# Patient Record
Sex: MALE | Race: WHITE | Employment: STUDENT | URBAN - NONMETROPOLITAN AREA
[De-identification: names, ages, dates, MRNs, and addresses within clinical notes are randomized per-mention and may not be internally consistent; named-entity substitution may affect disease eponyms.]

---

## 2019-04-10 ENCOUNTER — OFFICE VISIT (OUTPATIENT)
Dept: ORTHOPEDIC SURGERY | Age: 19
End: 2019-04-10
Payer: COMMERCIAL

## 2019-04-10 VITALS
DIASTOLIC BLOOD PRESSURE: 67 MMHG | HEIGHT: 70 IN | BODY MASS INDEX: 20.76 KG/M2 | WEIGHT: 145 LBS | SYSTOLIC BLOOD PRESSURE: 106 MMHG

## 2019-04-10 DIAGNOSIS — M79.605 LEFT LEG PAIN: Primary | ICD-10-CM

## 2019-04-10 PROCEDURE — 99204 OFFICE O/P NEW MOD 45 MIN: CPT | Performed by: ORTHOPAEDIC SURGERY

## 2019-04-10 PROCEDURE — L4361 PNEUMA/VAC WALK BOOT PRE OTS: HCPCS | Performed by: ORTHOPAEDIC SURGERY

## 2019-04-10 NOTE — PROGRESS NOTES
History of Present Illness:  Steven Hylton is a 22 y.o. male seen today for the 1st time was playing ball in planted he felt a snap and pain laterally with severe pain where he couldn't play anymore    Chief complaint that brought the patient in the office today: Left lateral lower leg pain      Location left lateral mid shaft fibula pain  Severity 8 out of 10  Duration 1 day  Associated sign/symptoms he has significant difficulty walking cannot do calf raises severe lateral pain    I have reviewed and discussed the below Pain assessment findings with the patient. Pain Assessment  Location of Pain: Leg  Location Modifiers: Left, Lateral, Posterior  Severity of Pain: 8  Quality of Pain: Throbbing, Sharp, Aching  Duration of Pain: Persistent  Frequency of Pain: Constant  Date Pain First Started: 04/09/19  Aggravating Factors: Straightening, Walking, Standing, Stairs  Limiting Behavior: Yes  Relieving Factors: Rest  Result of Injury: Yes  Work-Related Injury: No  Are there other pain locations you wish to document?: No    Medical History  Patient's medications, allergies, past medical, surgical, social and family histories were reviewed and updated as appropriate. History reviewed. No pertinent past medical history. History reviewed. No pertinent family history.   Social History     Socioeconomic History    Marital status: None     Spouse name: None    Number of children: None    Years of education: None    Highest education level: None   Occupational History    None   Social Needs    Financial resource strain: None    Food insecurity:     Worry: None     Inability: None    Transportation needs:     Medical: None     Non-medical: None   Tobacco Use    Smoking status: Never Smoker    Smokeless tobacco: Never Used   Substance and Sexual Activity    Alcohol use: None    Drug use: None    Sexual activity: None   Lifestyle    Physical activity:     Days per week: None     Minutes per session: None    Stress: None   Relationships    Social connections:     Talks on phone: None     Gets together: None     Attends Moravian service: None     Active member of club or organization: None     Attends meetings of clubs or organizations: None     Relationship status: None    Intimate partner violence:     Fear of current or ex partner: None     Emotionally abused: None     Physically abused: None     Forced sexual activity: None   Other Topics Concern    None   Social History Narrative    None     No current outpatient medications on file. No current facility-administered medications for this visit. No Known Allergies    REVIEW OF SYSTEMS:   No rashes today  No new numbness  No tingling  No fevers  No depression  No new onset of pain        Pertinent items are noted in HPI  Review of systems reviewed from Patient History Form dated on 4/10/19 and available in the patient's chart under the Media tab. Examination:    General Exam:    Vitals: Blood pressure 106/67, height 5' 10\" (1.778 m), weight 145 lb (65.8 kg). Constitutional: Patient is adequately groomed with no evidence of malnutrition  Mental Status: The patient is oriented to time, place and person. The patient's mood and affect are appropriate. Lymphatic: The lymphatic examination bilaterally reveals all areas to be without enlargement or induration. Vascular: Examination reveals no swelling or calf tenderness. Peripheral pulses are palpable and 2+. Neurological: The patient has good coordination. There is no weakness or sensory deficit. Skin:    Head/Neck: inspection reveals no rashes, ulcerations or lesions. Trunk:  inspection reveals no rashes, ulcerations or lesions. Right Lower Extremity: inspection reveals no rashes, ulcerations or lesions. Left Lower Extremity: inspection reveals no rashes, ulcerations or lesions.       Lower Leg Examination  Inspection:  No obvious swelling or deformity    Palpation:  Severe pain along the mid shaft of the fibula    Rang of Motion:  Ankle motion is full knee motion is full    Strength:  Quadricep, hamstring, EHL, hip flexor, internal and external rotation of the hip against resistance, all 5 over 5 equal bilaterally    Special Tests:  Pedal pulses are +2 over 4 capillary refill is brisk sensation is intact negative Homans negative Julian negative Oneal's test no Achilles pain to palpation he does have pain with resisted external rotation of the ankle he has pain with compression or the squeeze test he has pain with external rotation    Skin: There are no rashes, ulcerations or lesions. Gait: Antalgic severe    Additional Comments:     Additional Examinations:  Right Lower Extremity: Examination of the right lower extremity does not show any tenderness, deformity or injury. Range of motion is unremarkable. There is no gross instability. There are no rashes, ulcerations or lesions. Strength and tone are normal.  Right Upper Extremity:  Examination of the right upper extremity does not show any tenderness, deformity or injury. Range of motion is unremarkable. There is no gross instability. There are no rashes, ulcerations or lesions. Strength and tone are normal.  Left Upper Extremity: Examination of the left upper extremity does not show any tenderness, deformity or injury. Range of motion is unremarkable. There is no gross instability. There are no rashes, ulcerations or lesions. Strength and tone are normal.  Lower Back: Examination of the lower back does not show any tenderness, deformity or injury. Range of motion is unremarkable. There is no gross instability. There are no rashes, ulcerations or lesions.   Strength and tone are normal.      Diagnostic Testing:    Views AP lateral  and I independently reviewed these films today in my office,   Body Part tibia and fibula Impression midshaft nondisplaced fibula fracture Assessment:  Left lower leg midshaft nondisplaced fibula fracture    Impression: Midshaft nondisplaced fibula fracture left fibula    Office Procedures:  No orders of the defined types were placed in this encounter. Treatment Plan:  High tide boot, follow-up in 2 weeks for another x-ray      Lee Maradiaga D.O. [address] [place] [address] 20 and Sports Medicine  Sports Fellowship Trained  Board Certified  [employer] Team Physician      Disclaimer: \"This note was dictated with voice recognition software. Though review and correction are routine, we apologize for any errors. \"

## 2019-04-11 ENCOUNTER — TELEPHONE (OUTPATIENT)
Dept: ORTHOPEDIC SURGERY | Age: 19
End: 2019-04-11

## 2019-04-11 NOTE — TELEPHONE ENCOUNTER
4/11/19  [facility] [place]    -  NO PRECERT REQUIRED - PER WALI -   REF #[id number] -  NDS

## 2019-04-24 ENCOUNTER — OFFICE VISIT (OUTPATIENT)
Dept: ORTHOPEDIC SURGERY | Age: 19
End: 2019-04-24
Payer: COMMERCIAL

## 2019-04-24 VITALS — WEIGHT: 145 LBS | BODY MASS INDEX: 20.76 KG/M2 | HEIGHT: 70 IN

## 2019-04-24 DIAGNOSIS — M79.605 LEFT LEG PAIN: Primary | ICD-10-CM

## 2019-04-24 PROCEDURE — G8420 CALC BMI NORM PARAMETERS: HCPCS | Performed by: ORTHOPAEDIC SURGERY

## 2019-04-24 PROCEDURE — 99214 OFFICE O/P EST MOD 30 MIN: CPT | Performed by: ORTHOPAEDIC SURGERY

## 2019-04-24 PROCEDURE — G8427 DOCREV CUR MEDS BY ELIG CLIN: HCPCS | Performed by: ORTHOPAEDIC SURGERY

## 2019-04-24 PROCEDURE — 1036F TOBACCO NON-USER: CPT | Performed by: ORTHOPAEDIC SURGERY

## 2019-04-24 NOTE — PROGRESS NOTES
depression  No new onset of pain        Pertinent items are noted in HPI  Review of systems reviewed from Patient History Form dated on 4/10/19 and available in the patient's chart under the Media tab. Examination:    General Exam:    Vitals: Height 5' 10\" (1.778 m), weight 145 lb (65.8 kg). Constitutional: Patient is adequately groomed with no evidence of malnutrition  Mental Status: The patient is oriented to time, place and person. The patient's mood and affect are appropriate. Lymphatic: The lymphatic examination bilaterally reveals all areas to be without enlargement or induration. Vascular: Examination reveals no swelling or calf tenderness. Peripheral pulses are palpable and 2+. Neurological: The patient has good coordination. There is no weakness or sensory deficit. Skin:    Head/Neck: inspection reveals no rashes, ulcerations or lesions. Trunk:  inspection reveals no rashes, ulcerations or lesions. Right Lower Extremity: inspection reveals no rashes, ulcerations or lesions. Left Lower Extremity: inspection reveals no rashes, ulcerations or lesions. Lumbar/Sacral Spine Examination  Inspection:  No obvious swelling or deformity    Palpation:  Mild pain to palpation along the distal fib/fracture site    Rang of Motion:  Good range of motion of his knee and ankle without pain    Strength:  Quadricep, hamstring, EHL, hip flexor, internal and external rotation of the hip against resistance, all 5 over 5 equal bilaterally    Special Tests:  Pedal pulses are +2 over 4 capillary refill is brisk sensation is intact negative Homans negative Julian negative Oneal's test negative anterior drawer negative talar tilt still has pain to palpation at the fracture site of this distal one third fibula fracture    Skin: There are no rashes, ulcerations or lesions.     Gait: high tide boot    Additional Comments:     Additional Examinations:  Right Lower Extremity: Examination of the right lower extremity does not show any tenderness, deformity or injury. Range of motion is unremarkable. There is no gross instability. There are no rashes, ulcerations or lesions. Strength and tone are normal.  Right Upper Extremity:  Examination of the right upper extremity does not show any tenderness, deformity or injury. Range of motion is unremarkable. There is no gross instability. There are no rashes, ulcerations or lesions. Strength and tone are normal.  Left Upper Extremity: Examination of the left upper extremity does not show any tenderness, deformity or injury. Range of motion is unremarkable. There is no gross instability. There are no rashes, ulcerations or lesions. Strength and tone are normal.  Lower Back: Examination of the lower back does not show any tenderness, deformity or injury. Range of motion is unremarkable. There is no gross instability. There are no rashes, ulcerations or lesions. Strength and tone are normal.      Diagnostic Testing:    Views AP lateral  and I independently reviewed these films today in my office,   Body Part fibula and tibia Impression distal one third to mid one third fibula fracture in good alignment          Assessment:  Unchanged fibula fracture    Impression: unchanged fibula fracture    Office Procedures:  Orders Placed This Encounter   Procedures    XR TIBIA FIBULA LEFT (2 VIEWS)     Standing Status:   Future     Number of Occurrences:   1     Standing Expiration Date:   4/24/2020       Treatment Plan:  High tide boot for 2 more weeks follow up for 1 more x-ray and we'll start him doing some therapy      Deatra Mohs. Hess, D.O. [facility] and Sports Medicine  Sports Fellowship Trained  Board Certified  Hiral [employer] Team Physician      Disclaimer: \"This note was dictated with voice recognition software. Though review and correction are routine, we apologize for any errors. \"

## 2019-05-08 ENCOUNTER — OFFICE VISIT (OUTPATIENT)
Dept: ORTHOPEDIC SURGERY | Age: 19
End: 2019-05-08
Payer: COMMERCIAL

## 2019-05-08 VITALS — WEIGHT: 145.06 LBS | BODY MASS INDEX: 20.77 KG/M2 | HEIGHT: 70 IN

## 2019-05-08 DIAGNOSIS — M79.605 LEFT LEG PAIN: Primary | ICD-10-CM

## 2019-05-08 PROCEDURE — 1036F TOBACCO NON-USER: CPT | Performed by: ORTHOPAEDIC SURGERY

## 2019-05-08 PROCEDURE — G8427 DOCREV CUR MEDS BY ELIG CLIN: HCPCS | Performed by: ORTHOPAEDIC SURGERY

## 2019-05-08 PROCEDURE — G8420 CALC BMI NORM PARAMETERS: HCPCS | Performed by: ORTHOPAEDIC SURGERY

## 2019-05-08 PROCEDURE — 99214 OFFICE O/P EST MOD 30 MIN: CPT | Performed by: ORTHOPAEDIC SURGERY

## 2019-05-08 NOTE — PROGRESS NOTES
History of Present Illness:  Philomena Johnson is a 22 y.o. male recheck evaluation left fibula fracture    Chief complaint that brought the patient in the office today: He is doing very well much less pain      Location left mid to distal one third fibula fracture  Severity significantly improved  Duration 4 weeks since we started him in a high tide boot  Associated sign/symptoms no real symptomatology at this time    Medical History  Patient's medications, allergies, past medical, surgical, social and family histories were reviewed and updated as appropriate. History reviewed. No pertinent past medical history. History reviewed. No pertinent family history. Social History     Socioeconomic History    Marital status: Single     Spouse name: None    Number of children: None    Years of education: None    Highest education level: None   Occupational History    None   Social Needs    Financial resource strain: None    Food insecurity:     Worry: None     Inability: None    Transportation needs:     Medical: None     Non-medical: None   Tobacco Use    Smoking status: Never Smoker    Smokeless tobacco: Never Used   Substance and Sexual Activity    Alcohol use: None    Drug use: None    Sexual activity: None   Lifestyle    Physical activity:     Days per week: None     Minutes per session: None    Stress: None   Relationships    Social connections:     Talks on phone: None     Gets together: None     Attends Jain service: None     Active member of club or organization: None     Attends meetings of clubs or organizations: None     Relationship status: None    Intimate partner violence:     Fear of current or ex partner: None     Emotionally abused: None     Physically abused: None     Forced sexual activity: None   Other Topics Concern    None   Social History Narrative    None     No current outpatient medications on file. No current facility-administered medications for this visit.       No Known Allergies    REVIEW OF SYSTEMS:   No rashes today  No new numbness  No tingling  No fevers  No depression  No new onset of pain        Pertinent items are noted in HPI  Review of systems reviewed from Patient History Form dated on 4/10/19 and available in the patient's chart under the Media tab. Examination:    General Exam:    Vitals: Height 5' 10\" (1.778 m), weight 145 lb 1 oz (65.8 kg). Constitutional: Patient is adequately groomed with no evidence of malnutrition  Mental Status: The patient is oriented to time, place and person. The patient's mood and affect are appropriate. Lymphatic: The lymphatic examination bilaterally reveals all areas to be without enlargement or induration. Vascular: Examination reveals no swelling or calf tenderness. Peripheral pulses are palpable and 2+. Neurological: The patient has good coordination. There is no weakness or sensory deficit. Skin:    Head/Neck: inspection reveals no rashes, ulcerations or lesions. Trunk:  inspection reveals no rashes, ulcerations or lesions. Right Lower Extremity: inspection reveals no rashes, ulcerations or lesions. Left Lower Extremity: inspection reveals no rashes, ulcerations or lesions. Leg Examination  Inspection:  Inspection shows no swelling no formal deformity    Palpation:  No significant pain to palpation at this time    Rang of Motion:  Full active and passive range of motion of his knee And ankle    Strength:  Quadricep, hamstring, EHL, hip flexor, internal and external rotation of the hip against resistance, all 5 over 5 equal bilaterally    Special Tests:  Pedal pulses are +2 over 4 capillary refill is brisk sensation is intact negative Homans negative Julian negative Oneal's test    Skin: There are no rashes, ulcerations or lesions.     Gait: Antalgic gait    Additional Comments:     Additional Examinations:  Right Lower Extremity: Examination of the right lower extremity does not show any tenderness, deformity or injury. Range of motion is unremarkable. There is no gross instability. There are no rashes, ulcerations or lesions. Strength and tone are normal.  Right Upper Extremity:  Examination of the right upper extremity does not show any tenderness, deformity or injury. Range of motion is unremarkable. There is no gross instability. There are no rashes, ulcerations or lesions. Strength and tone are normal.  Left Upper Extremity: Examination of the left upper extremity does not show any tenderness, deformity or injury. Range of motion is unremarkable. There is no gross instability. There are no rashes, ulcerations or lesions. Strength and tone are normal.  Lower Back: Examination of the lower back does not show any tenderness, deformity or injury. Range of motion is unremarkable. There is no gross instability. There are no rashes, ulcerations or lesions. Strength and tone are normal.      Diagnostic Testing:    Views AP lateral  and I independently reviewed these films today in my office,   Body Part tibia and fibula Impression callus formation around the fibula fracture is excellent          Assessment:  4 weeks healing fibula fracture    Impression: Same    Office Procedures:  Orders Placed This Encounter   Procedures    XR TIBIA FIBULA LEFT (2 VIEWS)     Standing Status:   Future     Number of Occurrences:   1     Standing Expiration Date:   5/8/2020       Treatment Plan:  I would like for him to wean out of the high tide boot and start jogging in 3 weeks      Gurjit Kidd. LINDA Maradiaga. [address] [place] [address] 20 and Sports Medicine  Sports Fellowship Trained  Board Certified  [employer] Team Physician      Disclaimer: \"This note was dictated with voice recognition software. Though review and correction are routine, we apologize for any errors. \"

## 2020-04-18 ENCOUNTER — APPOINTMENT (OUTPATIENT)
Dept: CT IMAGING | Age: 20
DRG: 155 | End: 2020-04-18
Payer: COMMERCIAL

## 2020-04-18 ENCOUNTER — HOSPITAL ENCOUNTER (INPATIENT)
Age: 20
LOS: 1 days | Discharge: HOME | DRG: 155 | End: 2020-04-19
Attending: EMERGENCY MEDICINE | Admitting: SURGERY
Payer: COMMERCIAL
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
## 2020-04-18 LAB
ALBUMIN SERPL-MCNC: 4.4 G/DL (ref 3.5–5.1)
ALP BLD-CCNC: 62 U/L (ref 38–126)
ALT SERPL-CCNC: 15 U/L (ref 11–66)
ANION GAP SERPL CALCULATED.3IONS-SCNC: 14 MEQ/L (ref 8–16)
APTT: 25.9 SECONDS (ref 22–38)
AST SERPL-CCNC: 25 U/L (ref 5–40)
BILIRUB SERPL-MCNC: 0.5 MG/DL (ref 0.3–1.2)
BUN BLDV-MCNC: 13 MG/DL (ref 7–22)
CALCIUM SERPL-MCNC: 9.3 MG/DL (ref 8.5–10.5)
CHLORIDE BLD-SCNC: 100 MEQ/L (ref 98–111)
CO2: 26 MEQ/L (ref 23–33)
CREAT SERPL-MCNC: 1 MG/DL (ref 0.4–1.2)
ERYTHROCYTE [DISTWIDTH] IN BLOOD BY AUTOMATED COUNT: 11.8 % (ref 11.5–14.5)
ERYTHROCYTE [DISTWIDTH] IN BLOOD BY AUTOMATED COUNT: 39.6 FL (ref 35–45)
GFR SERPL CREATININE-BSD FRML MDRD: > 90 ML/MIN/1.73M2
GLUCOSE BLD-MCNC: 142 MG/DL (ref 70–108)
HCT VFR BLD CALC: 46.6 % (ref 42–52)
HEMOGLOBIN: 15.8 GM/DL (ref 14–18)
INR BLD: 1.12 (ref 0.85–1.13)
LACTIC ACID, SEPSIS: 1.9 MMOL/L (ref 0.5–1.9)
LACTIC ACID, SEPSIS: 2.5 MMOL/L (ref 0.5–1.9)
MCH RBC QN AUTO: 31.2 PG (ref 26–33)
MCHC RBC AUTO-ENTMCNC: 33.9 GM/DL (ref 32.2–35.5)
MCV RBC AUTO: 91.9 FL (ref 80–94)
OSMOLALITY CALCULATION: 281.9 MOSMOL/KG (ref 275–300)
PLATELET # BLD: 314 THOU/MM3 (ref 130–400)
PMV BLD AUTO: 8.4 FL (ref 9.4–12.4)
POTASSIUM REFLEX MAGNESIUM: 3.9 MEQ/L (ref 3.5–5.2)
RBC # BLD: 5.07 MILL/MM3 (ref 4.7–6.1)
SODIUM BLD-SCNC: 140 MEQ/L (ref 135–145)
TOTAL PROTEIN: 7.3 G/DL (ref 6.1–8)
WBC # BLD: 12.8 THOU/MM3 (ref 4.8–10.8)

## 2020-04-18 PROCEDURE — 1200000000 HC SEMI PRIVATE

## 2020-04-18 PROCEDURE — 80053 COMPREHEN METABOLIC PANEL: CPT

## 2020-04-18 PROCEDURE — 96374 THER/PROPH/DIAG INJ IV PUSH: CPT

## 2020-04-18 PROCEDURE — 85730 THROMBOPLASTIN TIME PARTIAL: CPT

## 2020-04-18 PROCEDURE — 36415 COLL VENOUS BLD VENIPUNCTURE: CPT

## 2020-04-18 PROCEDURE — 85027 COMPLETE CBC AUTOMATED: CPT

## 2020-04-18 PROCEDURE — G0378 HOSPITAL OBSERVATION PER HR: HCPCS

## 2020-04-18 PROCEDURE — APPSS60 APP SPLIT SHARED TIME 46-60 MINUTES: Performed by: PHYSICIAN ASSISTANT

## 2020-04-18 PROCEDURE — 85610 PROTHROMBIN TIME: CPT

## 2020-04-18 PROCEDURE — 72125 CT NECK SPINE W/O DYE: CPT

## 2020-04-18 PROCEDURE — 76376 3D RENDER W/INTRP POSTPROCES: CPT

## 2020-04-18 PROCEDURE — 6820000002 HC L2 INJURY CALL ACTIVATION

## 2020-04-18 PROCEDURE — 74177 CT ABD & PELVIS W/CONTRAST: CPT

## 2020-04-18 PROCEDURE — 71260 CT THORAX DX C+: CPT

## 2020-04-18 PROCEDURE — 99285 EMERGENCY DEPT VISIT HI MDM: CPT

## 2020-04-18 PROCEDURE — 0HQ1XZZ REPAIR FACE SKIN, EXTERNAL APPROACH: ICD-10-PCS | Performed by: EMERGENCY MEDICINE

## 2020-04-18 PROCEDURE — 0CQ1XZZ REPAIR LOWER LIP, EXTERNAL APPROACH: ICD-10-PCS | Performed by: EMERGENCY MEDICINE

## 2020-04-18 PROCEDURE — 99223 1ST HOSP IP/OBS HIGH 75: CPT | Performed by: SURGERY

## 2020-04-18 PROCEDURE — 6360000004 HC RX CONTRAST MEDICATION: Performed by: EMERGENCY MEDICINE

## 2020-04-18 PROCEDURE — 83605 ASSAY OF LACTIC ACID: CPT

## 2020-04-18 PROCEDURE — 70450 CT HEAD/BRAIN W/O DYE: CPT

## 2020-04-18 PROCEDURE — 6360000002 HC RX W HCPCS: Performed by: PHYSICIAN ASSISTANT

## 2020-04-18 PROCEDURE — 70486 CT MAXILLOFACIAL W/O DYE: CPT

## 2020-04-18 PROCEDURE — 2709999900 HC NON-CHARGEABLE SUPPLY

## 2020-04-18 RX ORDER — PROMETHAZINE HYDROCHLORIDE 25 MG/1
12.5 TABLET ORAL EVERY 6 HOURS PRN
Status: DISCONTINUED | OUTPATIENT
Start: 2020-04-18 | End: 2020-04-19 | Stop reason: HOSPADM

## 2020-04-18 RX ORDER — CLINDAMYCIN PHOSPHATE AND BENZOYL PEROXIDE 10; 37.5 MG/G; MG/G
1 GEL TOPICAL DAILY PRN
Status: ON HOLD | COMMUNITY
End: 2020-04-19 | Stop reason: HOSPADM

## 2020-04-18 RX ORDER — SODIUM CHLORIDE 0.9 % (FLUSH) 0.9 %
10 SYRINGE (ML) INJECTION EVERY 12 HOURS SCHEDULED
Status: DISCONTINUED | OUTPATIENT
Start: 2020-04-18 | End: 2020-04-19 | Stop reason: HOSPADM

## 2020-04-18 RX ORDER — HYDROCODONE BITARTRATE AND ACETAMINOPHEN 5; 325 MG/1; MG/1
1 TABLET ORAL EVERY 4 HOURS PRN
Status: DISCONTINUED | OUTPATIENT
Start: 2020-04-18 | End: 2020-04-19 | Stop reason: HOSPADM

## 2020-04-18 RX ORDER — ACETAMINOPHEN 325 MG/1
650 TABLET ORAL EVERY 4 HOURS PRN
Status: DISCONTINUED | OUTPATIENT
Start: 2020-04-18 | End: 2020-04-19 | Stop reason: HOSPADM

## 2020-04-18 RX ORDER — SODIUM CHLORIDE 0.9 % (FLUSH) 0.9 %
10 SYRINGE (ML) INJECTION PRN
Status: DISCONTINUED | OUTPATIENT
Start: 2020-04-18 | End: 2020-04-19 | Stop reason: HOSPADM

## 2020-04-18 RX ORDER — ONDANSETRON 2 MG/ML
4 INJECTION INTRAMUSCULAR; INTRAVENOUS EVERY 6 HOURS PRN
Status: DISCONTINUED | OUTPATIENT
Start: 2020-04-18 | End: 2020-04-19 | Stop reason: HOSPADM

## 2020-04-18 RX ORDER — POLYETHYLENE GLYCOL 3350 17 G/17G
17 POWDER, FOR SOLUTION ORAL DAILY PRN
Status: DISCONTINUED | OUTPATIENT
Start: 2020-04-18 | End: 2020-04-19 | Stop reason: HOSPADM

## 2020-04-18 RX ORDER — MORPHINE SULFATE 4 MG/ML
4 INJECTION, SOLUTION INTRAMUSCULAR; INTRAVENOUS
Status: DISCONTINUED | OUTPATIENT
Start: 2020-04-18 | End: 2020-04-19 | Stop reason: HOSPADM

## 2020-04-18 RX ORDER — HYDROCODONE BITARTRATE AND ACETAMINOPHEN 5; 325 MG/1; MG/1
2 TABLET ORAL EVERY 4 HOURS PRN
Status: DISCONTINUED | OUTPATIENT
Start: 2020-04-18 | End: 2020-04-19 | Stop reason: HOSPADM

## 2020-04-18 RX ORDER — ONDANSETRON 2 MG/ML
4 INJECTION INTRAMUSCULAR; INTRAVENOUS EVERY 6 HOURS PRN
Status: DISCONTINUED | OUTPATIENT
Start: 2020-04-18 | End: 2020-04-18 | Stop reason: SDUPTHER

## 2020-04-18 RX ORDER — MORPHINE SULFATE 2 MG/ML
2 INJECTION, SOLUTION INTRAMUSCULAR; INTRAVENOUS
Status: DISCONTINUED | OUTPATIENT
Start: 2020-04-18 | End: 2020-04-19 | Stop reason: HOSPADM

## 2020-04-18 RX ADMIN — IOPAMIDOL 80 ML: 755 INJECTION, SOLUTION INTRAVENOUS at 21:07

## 2020-04-18 RX ADMIN — ONDANSETRON 4 MG: 2 INJECTION INTRAMUSCULAR; INTRAVENOUS at 22:18

## 2020-04-18 ASSESSMENT — ENCOUNTER SYMPTOMS
APNEA: 0
DIARRHEA: 0
EYES NEGATIVE: 1
BACK PAIN: 0
ROS SKIN COMMENTS: MULTIPLE ABRASIONS
NAUSEA: 0
SORE THROAT: 0
CHEST TIGHTNESS: 0
COUGH: 0
BLOOD IN STOOL: 0
CHOKING: 0
ABDOMINAL PAIN: 0
VOMITING: 0
ABDOMINAL DISTENTION: 0
SHORTNESS OF BREATH: 0
WHEEZING: 0

## 2020-04-18 ASSESSMENT — PAIN SCALES - GENERAL
PAINLEVEL_OUTOF10: 6
PAINLEVEL_OUTOF10: 6

## 2020-04-18 ASSESSMENT — PAIN DESCRIPTION - PAIN TYPE
TYPE: ACUTE PAIN
TYPE: ACUTE PAIN

## 2020-04-18 ASSESSMENT — PAIN DESCRIPTION - LOCATION
LOCATION: NECK
LOCATION: FACE;HEAD;NECK

## 2020-04-19 VITALS
TEMPERATURE: 98.2 F | OXYGEN SATURATION: 97 % | BODY MASS INDEX: 21.37 KG/M2 | HEART RATE: 80 BPM | HEIGHT: 70 IN | RESPIRATION RATE: 16 BRPM | SYSTOLIC BLOOD PRESSURE: 114 MMHG | WEIGHT: 149.3 LBS | DIASTOLIC BLOOD PRESSURE: 71 MMHG

## 2020-04-19 LAB
AMPHETAMINE+METHAMPHETAMINE URINE SCREEN: NEGATIVE
BARBITURATE QUANTITATIVE URINE: NEGATIVE
BENZODIAZEPINE QUANTITATIVE URINE: NEGATIVE
BILIRUBIN URINE: NEGATIVE
BLOOD, URINE: NEGATIVE
CANNABINOID QUANTITATIVE URINE: NEGATIVE
CHARACTER, URINE: CLEAR
COCAINE METABOLITE QUANTITATIVE URINE: NEGATIVE
COLOR: YELLOW
GLUCOSE, URINE: NEGATIVE MG/DL
KETONES, URINE: NEGATIVE
LACTIC ACID, SEPSIS: 1 MMOL/L (ref 0.5–1.9)
LEUKOCYTE EST, POC: NEGATIVE
NITRITE, URINE: NEGATIVE
OPIATES, URINE: NEGATIVE
OXYCODONE: NEGATIVE
PH UA: 6.5 (ref 5–9)
PHENCYCLIDINE QUANTITATIVE URINE: NEGATIVE
PROTEIN UA: NEGATIVE MG/DL
SPECIFIC GRAVITY UA: 1.01 (ref 1–1.03)
UROBILINOGEN, URINE: 0.2 EU/DL (ref 0–1)

## 2020-04-19 PROCEDURE — 83605 ASSAY OF LACTIC ACID: CPT

## 2020-04-19 PROCEDURE — 6370000000 HC RX 637 (ALT 250 FOR IP): Performed by: PHYSICIAN ASSISTANT

## 2020-04-19 PROCEDURE — APPNB180 APP NON BILLABLE TIME > 60 MINS: Performed by: PHYSICIAN ASSISTANT

## 2020-04-19 PROCEDURE — 36415 COLL VENOUS BLD VENIPUNCTURE: CPT

## 2020-04-19 PROCEDURE — 99232 SBSQ HOSP IP/OBS MODERATE 35: CPT | Performed by: SURGERY

## 2020-04-19 PROCEDURE — 94760 N-INVAS EAR/PLS OXIMETRY 1: CPT

## 2020-04-19 PROCEDURE — G0378 HOSPITAL OBSERVATION PER HR: HCPCS

## 2020-04-19 PROCEDURE — APPSS60 APP SPLIT SHARED TIME 46-60 MINUTES: Performed by: PHYSICIAN ASSISTANT

## 2020-04-19 PROCEDURE — 99255 IP/OBS CONSLTJ NEW/EST HI 80: CPT | Performed by: SURGERY

## 2020-04-19 PROCEDURE — 81003 URINALYSIS AUTO W/O SCOPE: CPT

## 2020-04-19 PROCEDURE — 2580000003 HC RX 258: Performed by: PHYSICIAN ASSISTANT

## 2020-04-19 PROCEDURE — 80307 DRUG TEST PRSMV CHEM ANLYZR: CPT

## 2020-04-19 PROCEDURE — 97161 PT EVAL LOW COMPLEX 20 MIN: CPT

## 2020-04-19 RX ORDER — HYDROCODONE BITARTRATE AND ACETAMINOPHEN 5; 325 MG/1; MG/1
1 TABLET ORAL EVERY 6 HOURS PRN
Qty: 12 TABLET | Refills: 0 | Status: SHIPPED | OUTPATIENT
Start: 2020-04-19 | End: 2020-04-22

## 2020-04-19 RX ADMIN — HYDROCODONE BITARTRATE AND ACETAMINOPHEN 1 TABLET: 5; 325 TABLET ORAL at 09:59

## 2020-04-19 RX ADMIN — Medication 10 ML: at 10:00

## 2020-04-19 RX ADMIN — HYDROCODONE BITARTRATE AND ACETAMINOPHEN 2 TABLET: 5; 325 TABLET ORAL at 15:31

## 2020-04-19 RX ADMIN — Medication 10 ML: at 00:59

## 2020-04-19 RX ADMIN — HYDROCODONE BITARTRATE AND ACETAMINOPHEN 1 TABLET: 5; 325 TABLET ORAL at 02:38

## 2020-04-19 ASSESSMENT — PAIN DESCRIPTION - ORIENTATION
ORIENTATION: MID
ORIENTATION: MID

## 2020-04-19 ASSESSMENT — PAIN DESCRIPTION - LOCATION
LOCATION: FACE;HEAD
LOCATION: FACE;HEAD

## 2020-04-19 ASSESSMENT — PAIN DESCRIPTION - DESCRIPTORS
DESCRIPTORS: ACHING
DESCRIPTORS: ACHING

## 2020-04-19 ASSESSMENT — PAIN DESCRIPTION - ONSET
ONSET: ON-GOING
ONSET: ON-GOING

## 2020-04-19 ASSESSMENT — PAIN DESCRIPTION - PAIN TYPE
TYPE: ACUTE PAIN
TYPE: ACUTE PAIN

## 2020-04-19 ASSESSMENT — PAIN SCALES - GENERAL
PAINLEVEL_OUTOF10: 5
PAINLEVEL_OUTOF10: 0
PAINLEVEL_OUTOF10: 3
PAINLEVEL_OUTOF10: 0
PAINLEVEL_OUTOF10: 0
PAINLEVEL_OUTOF10: 7

## 2020-04-19 ASSESSMENT — PAIN - FUNCTIONAL ASSESSMENT
PAIN_FUNCTIONAL_ASSESSMENT: ACTIVITIES ARE NOT PREVENTED
PAIN_FUNCTIONAL_ASSESSMENT: ACTIVITIES ARE NOT PREVENTED

## 2020-04-19 ASSESSMENT — PAIN DESCRIPTION - FREQUENCY
FREQUENCY: CONTINUOUS
FREQUENCY: CONTINUOUS

## 2020-04-19 ASSESSMENT — PAIN DESCRIPTION - PROGRESSION
CLINICAL_PROGRESSION: GRADUALLY WORSENING
CLINICAL_PROGRESSION: GRADUALLY WORSENING
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
## 2020-04-19 NOTE — PLAN OF CARE
Problem: Pain:  Goal: Pain level will decrease  Description: Pain level will decrease  Outcome: Ongoing  Note: Patient complains of facial/neck pain rated 5-6/10 during this shift. PRN pain medications administered as ordered. Will continue to monitor. Problem: Pain:  Goal: Control of acute pain  Description: Control of acute pain  Outcome: Ongoing  Note: Patient complains of facial/neck pain rated 5-6/10 during this shift. PRN pain medications administered as ordered. Will continue to monitor. Problem: Pain:  Goal: Control of chronic pain  Description: Control of chronic pain  Outcome: Ongoing  Note: Patient complains of facial/neck pain rated 5-6/10 during this shift. PRN pain medications administered as ordered. Will continue to monitor. Problem: Falls - Risk of:  Goal: Will remain free from falls  Description: Will remain free from falls  Outcome: Ongoing  Note: Patient remained free from falls during this shift. Patient is educated on and encouraged to use the call light for assistance from staff with needs. Bed wheels are locked and bed is in lowest position; bed alarm on. Possessions are within reach; Pathways are clear     Problem: Falls - Risk of:  Goal: Absence of physical injury  Description: Absence of physical injury  Outcome: Ongoing  Note: Patient remained free from falls during this shift. Patient is educated on and encouraged to use the call light for assistance from staff with needs. Bed wheels are locked and bed is in lowest position; bed alarm on. Possessions are within reach; Pathways are clear     Problem: Discharge Planning:  Goal: Discharged to appropriate level of care  Description: Discharged to appropriate level of care  Outcome: Ongoing  Note: Discharge planning in progress at this time. Patient expects to be discharged home with family. Care plan reviewed with patient. Patient verbalizes understanding of the plan of care and contributes to goal setting.

## 2020-04-19 NOTE — ED NOTES
ED to inpatient nurses report    Chief Complaint   [facility] presents [facility]   [facility]    Trauma      Present to ED from home  LOC: alert and orientated to name, place, date  Vital signs   Vitals:    04/18/20 2016 04/18/20 2030 04/18/20 2112 04/18/20 2142   BP: 131/75 126/73 127/78 117/79   Pulse: 86 72 81 75   Resp:       Temp:       TempSrc:       SpO2: 99% 100% 100% 99%   Weight:       Height:          Oxygen Baseline 99% ROOM AIR     Current needs required NONE Bipap/Cpap No  LDAs:   Peripheral IV 04/18/20 Left Antecubital (Active)   Site Assessment Dry; Intact; Clean 4/18/2020  9:56 PM   Line Status Normal saline locked; Flushed;Specimen collected 4/18/2020  7:54 PM   Dressing Status Clean;Dry; Intact 4/18/2020  7:54 PM   Dressing Intervention New 4/18/2020  7:54 PM       Peripheral IV 04/18/20 Right Antecubital (Active)   Site Assessment Dry; Intact; Clean 4/18/2020  9:56 PM   Dressing Status Clean;Dry; Intact 4/18/2020  9:35 PM     Mobility: Requires assistance * 1  Pending ED orders: NONE   Present condition: STABLE / HEADACHE     Electronically signed by Jordan Crenshaw RN on 4/18/2020 at 9:56 PM       Jordan Crenshaw RN  04/18/20 5610
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
## 2020-04-20 NOTE — DISCHARGE SUMMARY
needed for Pain for up to 3 days. Patient Instructions:    Discharge lab work: none  Activity: activity as tolerated  Diet: DIET GENERAL;    Code Status: Full Code    Follow-up visits:   Noel Engel, [address] [place] [address]  [phone]    Schedule an appointment as soon as possible for a visit  Please call office to schedule appointment for Wednesday    [address]  [phone]    Schedule an appointment as soon as possible for a visit in 5 days  For suture removal       Procedures: Sutures applied in ED    Consults:   Plastic surgery. Examination:  Vitals:  Vitals:    04/19/20 0945 04/19/20 1057 04/19/20 1123 04/19/20 1523   BP: (!) 140/70  124/65 114/71   Pulse: 102  81 80   Resp: 16  16 16   Temp: 99 °F (37.2 °C)  98.4 °F (36.9 °C) 98.2 °F (36.8 °C)   TempSrc: Oral  Oral Oral   SpO2: 96% 93% 96% 97%   Weight:       Height:         Weight: Weight: 149 lb 4.8 oz (67.7 kg)     24 hour intake/output:    Intake/Output Summary (Last 24 hours) at 4/19/2020 2015  Last data filed at 4/19/2020 0959  Gross per 24 hour   Intake 460 ml   Output --   Net 460 ml       GENERAL: Presents sitting upright in bed unassisted, alert and oriented; In no acute distress, well nourished, and appears stated age. SKIN: Pink, warm and dry. EYES: No apparent trauma, discharge, or hematoma bilaterally. Intact visual acuity - able to identify objects, PERRL at 3mm, EOM intact. MOUTH: Teeth present with fractured left central superior incisor, moderate swelling of lip margines, no active bleeding. Mucosa is pink, moist, absent of lesions, bleeding, or patches. CARDIO: No visible chest wall deformity. No heaves or lifts. Strong/regular S1/S2 rate and rhythm. No rubs murmurs, or gallops. 2+ radial, and dorsalis pedal pulses. Capillary refill <2 sec. No extremity edema noted.    PULMONARY:  Trachea midline, no audible wheezing, increased

## 2020-04-20 NOTE — PROGRESS NOTES
S: Patient states he feels well and is ready for discharge. He still experiences some facial pain, but has adequate analgesia. Denies headache, dizziness, nausea or photosensitivity. He states he has been experiencing some racing heart when he stands. Trauma surgeon and [facility] informed. Patient is not symptomatic from episodes, has no cardiac history, has never had a concern on cardiac exam and has no family history of cardiac illness. O: Appears alert and oriented. In pleasant mood. Stable from Trauma perspective. A/P: Patient prepared for and agreeable to discharge home. Mother contacted to transport him home. Instructed to follow up with Dr. Netta Baumgarten in 3 day on 4/22/20. Patient to have sutures removed by Dr. Netta Baumgarten at appointment or in 5 days (4/24/20) at PCP or urgent care. Patient advised to apply triple antibiotic ointment over the wounds, avoid opening mouth painfully wide, and to avoid activities that cause headache. Patient verbalized understanding that if headache worsens significantly, patient becomes difficult to arouse, begins vomiting, or experiences changes in vision he should contact PCP or go to ED. Care and disposition in coordination with Dr. Alvin Arias.   Electronically signed by DYANA Simon on 4/19/2020 at 8:13 PM

## 2020-04-22 ENCOUNTER — OFFICE VISIT (OUTPATIENT)
Dept: SURGERY | Age: 20
End: 2020-04-22
Payer: COMMERCIAL

## 2020-04-22 VITALS — SYSTOLIC BLOOD PRESSURE: 127 MMHG | DIASTOLIC BLOOD PRESSURE: 74 MMHG | HEART RATE: 69 BPM | TEMPERATURE: 97.5 F

## 2020-04-22 PROCEDURE — 99214 OFFICE O/P EST MOD 30 MIN: CPT | Performed by: SURGERY

## 2020-04-22 NOTE — PROGRESS NOTES
and lacerations present. Pharynx: Oropharynx is clear. Comments: Repaired laceration noted to the right lower lip  Eyes:      General: Lids are normal. Vision grossly intact. Right eye: No discharge. Left eye: No discharge. Extraocular Movements: Extraocular movements intact. Conjunctiva/sclera: Conjunctivae normal.      Right eye: Right conjunctiva is not injected. No chemosis or hemorrhage. Left eye: Left conjunctiva is not injected. No chemosis or hemorrhage. Pupils: Pupils are equal, round, and reactive to light. Neck:      Musculoskeletal: Full passive range of motion without pain and normal range of motion. Thyroid: No thyromegaly. Trachea: No tracheal deviation. Cardiovascular:      Rate and Rhythm: Normal rate. Pulses: Normal pulses. No decreased pulses. Pulmonary:      Effort: Pulmonary effort is normal. No respiratory distress. Abdominal:      General: Abdomen is flat. There is no distension. Palpations: Abdomen is soft. Tenderness: There is no abdominal tenderness. Musculoskeletal: Normal range of motion. General: No deformity. Skin:     General: Skin is warm. Capillary Refill: Capillary refill takes less than 2 seconds. Findings: No erythema or rash. Neurological:      General: No focal deficit present. Mental Status: He is alert and oriented to person, place, and time. Mental status is at baseline. Sensory: No sensory deficit. Psychiatric:         Mood and Affect: Mood normal.         Behavior: Behavior normal. Behavior is cooperative. Thought Content: Thought content normal.       Physical Exam   Head   Head comments: Repaired laceration noted to the forehead       PROCEDURE: CT FACIAL BONES WO CONTRAST       CLINICAL INFORMATION: trauma. COMPARISON: No prior study. TECHNIQUE: 3 mm non contrast axial CT images were obtained through the facial bones and orbits.  3 mm

## 2020-05-28 ASSESSMENT — ENCOUNTER SYMPTOMS
COUGH: 0
PHOTOPHOBIA: 0
SHORTNESS OF BREATH: 0
TROUBLE SWALLOWING: 0
FACIAL SWELLING: 0
COLOR CHANGE: 0
SINUS PRESSURE: 0
ABDOMINAL PAIN: 0
RHINORRHEA: 0
SINUS PAIN: 0

## 2020-05-28 ASSESSMENT — VISUAL ACUITY: OU: 1

## 2023-05-01 ENCOUNTER — HOSPITAL ENCOUNTER (OUTPATIENT)
Dept: PHYSICAL THERAPY | Age: 23
Setting detail: THERAPIES SERIES
Discharge: HOME | End: 2023-05-01
Payer: COMMERCIAL

## 2023-05-01 PROCEDURE — 97162 PT EVAL MOD COMPLEX 30 MIN: CPT

## 2023-05-01 PROCEDURE — 97110 THERAPEUTIC EXERCISES: CPT

## 2023-05-01 NOTE — PROGRESS NOTES
** PLEASE SIGN, DATE AND TIME CERTIFICATION BELOW AND RETURN TO [facility] OUTPATIENT REHABILITATION (FAX #: [phone]). ATTEST/CO-SIGN IF ACCESSING VIA [employer]fficient. THANK YOU.**    I certify that I have examined the patient below and determined that Physical Medicine and Rehabilitation service is necessary and that I approve the established plan of care for up to 90 days or as specifically noted. Attestation, signature or co-signature of physician indicates approval of certification requirements.    ________________________ ____________ __________  Physician Signature   Date   Time  [facility]  [facility]  [x] EVALUATION  [] DAILY NOTE (LAND) [] DAILY NOTE (AQUATIC ) [] PROGRESS NOTE [] DISCHARGE NOTE    [] [facility]   [] [facility] 90    [] [address] [facility]   [x] Arthor Moore    Date: 2023  Patient Name:  Juliano Paiz  : 2000  MRN: [id number]    Referring Practitioner Rhonda Padgett MD   Diagnosis Sprain of anterior cruciate ligament of right knee, subsequent encounter [F82.189J]    Treatment Diagnosis Right weakness and stiffness, , gait dysfunction. Date of Evaluation 23    Additional Pertinent History Healthy 20year old.   Did strain left knee in the past.m      Functional Outcome Measure Used KOOS, jr   Functional Outcome Score 22 (23)       Insurance: Primary: Payor: Abdiaziz Torres /  /  / ,   Secondary:    Authorization Information:  DEDUCTIBLE: $4000 MET $1371.21                         OUT OF POCKET: $24287 MET $1371.21              INSURANCE PAYS AT: 80% AFTER DED                PATIENT RESPONSIBILITY AND/OR CO-PAY: 20% AFTER DED   SECONDARY INSURANCE COMPANY:  NA      PRE CERTIFICATION REQUIRED: NO  INSURANCE THERAPY BENEFIT:  20 VISITS -HARDMAX -NONE USED  AQUATIC THERAPY COVERED: YES  MODALITIES COVERED:  YES, NO IONTO   Visit # 1, 1/10 for progress note   Visits Allowed: 20   Recertification Date:

## 2023-05-04 ENCOUNTER — HOSPITAL ENCOUNTER (OUTPATIENT)
Dept: PHYSICAL THERAPY | Age: 23
Setting detail: THERAPIES SERIES
Discharge: HOME | End: 2023-05-04
Payer: COMMERCIAL

## 2023-05-04 PROCEDURE — 97110 THERAPEUTIC EXERCISES: CPT

## 2023-05-04 PROCEDURE — 97116 GAIT TRAINING THERAPY: CPT

## 2023-05-04 NOTE — PROGRESS NOTES
[address] [facility]  PHYSICAL THERAPY  [] EVALUATION  [x] DAILY NOTE (LAND) [] DAILY NOTE (AQUATIC ) [] PROGRESS NOTE [] DISCHARGE NOTE    [] OUTPATIENT REHABILITATION CENTER [facility]   [] Annette     [] [address] [facility]   [x] Marcelino Mcdonough    Date: 2023  Patient Name:  Mike Sevilla  : 2000  MRN: [id number]    Referring Practitioner Linnette Gant MD   Diagnosis Sprain of anterior cruciate ligament of right knee, subsequent encounter [N47.986W]    Treatment Diagnosis Right weakness and stiffness, , gait dysfunction. Date of Evaluation 23    Additional Pertinent History Healthy 20year old. Did strain left knee in the past.m      Functional Outcome Measure Used KOjr NERI   Functional Outcome Score 22 (23)       Insurance: Primary: Payor: Amber Ruvalcaba /  /  / ,   Secondary:    Authorization Information:  DEDUCTIBLE: $4000 MET $8452.52                         OUT OF POCKET: $76303 MET $1371.21              INSURANCE PAYS AT: 80% AFTER DED                PATIENT RESPONSIBILITY AND/OR CO-PAY: 20% AFTER DED   SECONDARY INSURANCE COMPANY:  NA      PRE CERTIFICATION REQUIRED: NO  INSURANCE THERAPY BENEFIT:  20 VISITS -HARDMAX -NONE USED  AQUATIC THERAPY COVERED: YES  MODALITIES COVERED:  YES, NO IONTO   Visit # 2, 2/10 for progress note   Visits Allowed: 20   Recertification Date: 12 weeks, 23    Physician Follow-Up: May 10th    Physician Orders: Follow protocol  20 -30# wb for 4-6 weeks. No open chain extension. History of Present Illness: Skiing 4 weeks,  tore ACL  repaired on 23   with meniscus repair.        SUBJECTIVE: Reports doing well with crutches               TREATMENT   Precautions:  -# wb flex to 70 degrees  unitl 23,    Pain:2/10     X in shaded column indicates activity completed today   Modalities Parameters/  Location  Notes                     Manual Therapy Time/Technique  Notes               Drain removed, dressing

## 2023-05-09 ENCOUNTER — HOSPITAL ENCOUNTER (OUTPATIENT)
Dept: PHYSICAL THERAPY | Age: 23
Setting detail: THERAPIES SERIES
Discharge: HOME | End: 2023-05-09
Payer: COMMERCIAL

## 2023-05-09 PROCEDURE — 97110 THERAPEUTIC EXERCISES: CPT

## 2023-05-09 PROCEDURE — 97530 THERAPEUTIC ACTIVITIES: CPT

## 2023-05-09 NOTE — PROGRESS NOTES
[facility]  PHYSICAL THERAPY  [] EVALUATION  [x] DAILY NOTE (LAND) [] DAILY NOTE (AQUATIC ) [] PROGRESS NOTE [] DISCHARGE NOTE    [] OUT[facility]IENT [facility]ABIL[facility]ATION [facility] [place]   [] Annette     [] [address] [facility]   [x] Cynthia Cruz    Date: 2023  Patient Name:  Yuri Betancur  : 2000  MRN: [id number]    Referring Practitioner Alejandra Godfrey MD   Diagnosis Sprain of anterior cruciate ligament of right knee, subsequent encounter [S88.619Y]    Treatment Diagnosis Right weakness and stiffness, , gait dysfunction. Date of Evaluation 23    Additional Pertinent History Healthy 20year old. Did strain left knee in the past.m      Functional Outcome Measure Used KOOSjr   Functional Outcome Score 22 (23)       Insurance: Primary: Payor: Maury Parada /  /  / ,   Secondary:    Authorization Information:  DEDUCTIBLE: $4000 MET $6961.51                         OUT OF POCKET: $92175 MET $1371.21              INSURANCE PAYS AT: 80% AFTER DED                PATIENT RESPONSIBILITY AND/OR CO-PAY: 20% AFTER DED   SECONDARY INSURANCE COMPANY:  NA      PRE CERTIFICATION REQUIRED: NO  INSURANCE THERAPY BENEFIT:  20 VISITS -HARDMAX -NONE USED  AQUATIC THERAPY COVERED: YES  MODALITIES COVERED:  YES, NO IONTO   Visit # 3, 3/10 for progress note   Visits Allowed: 20   Recertification Date: 12 weeks, 23    Physician Follow-Up: May 10th    Physician Orders: Follow protocol  20 -30# wb for 4-6 weeks. No open chain extension. History of Present Illness: Skiing 4 weeks,  tore ACL  repaired on 23   with meniscus repair. SUBJECTIVE: Reports doing well with crutches.  No pain to report               TREATMENT   Precautions:  -# wb flex to 70 degrees  unitl 23,    Pain:3/10     X in shaded column indicates activity completed today   Modalities Parameters/  Location  Notes                     Manual Therapy Time/Technique  Notes               Drain

## 2023-05-11 ENCOUNTER — HOSPITAL ENCOUNTER (OUTPATIENT)
Dept: PHYSICAL THERAPY | Age: 23
Setting detail: THERAPIES SERIES
Discharge: HOME | End: 2023-05-11
Payer: COMMERCIAL

## 2023-05-11 PROCEDURE — 97530 THERAPEUTIC ACTIVITIES: CPT

## 2023-05-11 PROCEDURE — 97110 THERAPEUTIC EXERCISES: CPT

## 2023-05-11 NOTE — PROGRESS NOTES
removed, dressing applied      Exercise/Intervention   Notes   Supine:         Glut sets, quad sets, ankle pumps x10  x    Heel slides  x10  x Current limit=0-70 deg   Hamstring set        SLR 4-way x10  x    Prone hang for ext. X min  x           Patellar mobs X2 min  x           4-way ankle X15 ea  x blue          Gait training   step through with immobilizer parital wb x100ft  x    Stair training with crutches 2/3 steps x2   Immobilizer on            Specific Interventions Next Treatment: stair training, exericise , ice as needed, or manual therapy . Activity/Treatment Tolerance: no pain increase with exercise,  [x]  Patient tolerated treatment well  []  Patient limited by fatigue  []  Patient limited by pain   []  Patient limited by medical complications  []  Other:     Assessment: Doing HEP ~ 2x day    Body Structures/Functions/Activity Limitations: impaired activity tolerance, impaired balance, impaired ROM, impaired strength, pain, abnormal gait, and abnormal posture  Prognosis: good    GOALS:  Patient Goal: go back to driving , walking with crutches,     Short Term Goals:  Time Frame: 4-6     Decrease pain to less than 2/10   Iincrease knee ROM to 0-70  in 4 weeks   Increases knee strength to  4  to allow for raising leg up on step   Intiate HEP     Long Term Goals:  Time Frame: 12 weeks   Decrease pain to less than a 1/10  and only after exercise   Increase strength of the knee to 4+/5 to walk on all surfaces without  AD   I HEP       Patient Education: Discussed keeping site dry and what the signs of infection would look like per Pt questions. [x]  HEP/Education Completed: Plan of Care, Goals, exercise, precautions,   Medbridge Access Code:  []  No new Education completed  []  Reviewed Prior HEP      []  Patient verbalized and/or demonstrated understanding of education provided. []  Patient unable to verbalize and/or demonstrate understanding of education provided. Will continue education.   [x]

## 2023-05-15 ENCOUNTER — HOSPITAL ENCOUNTER (OUTPATIENT)
Dept: PHYSICAL THERAPY | Age: 23
Setting detail: THERAPIES SERIES
Discharge: HOME | End: 2023-05-15
Payer: COMMERCIAL

## 2023-05-15 PROCEDURE — 97110 THERAPEUTIC EXERCISES: CPT

## 2023-05-15 NOTE — PROGRESS NOTES
[address] [facility]  PHYSICAL THERAPY  [] EVALUATION  [x] DAILY NOTE (LAND) [] DAILY NOTE (AQUATIC ) [] PROGRESS NOTE [] DISCHARGE NOTE    [] OUTPATIENT REHABILITATION [facility] [place]   [] Wesley[facility]ulia     [] [address] [facility]   [x] Gallo Woodard    Date: 5/15/2023  Patient Name:  Sanaz Herrera  : 2000  MRN: [id number]    Referring Practitioner Andrea Tavera MD   Diagnosis Sprain of anterior cruciate ligament of right knee, subsequent encounter [T62.962M]    Treatment Diagnosis Right weakness and stiffness, , gait dysfunction. Date of Evaluation 23    Additional Pertinent History Healthy 20year old. Did strain left knee in the past.m      Functional Outcome Measure Used KOOS, jr   Functional Outcome Score 22 (23)       Insurance: Primary: Payor: Raphael Martinez /  /  / ,   Secondary:    Authorization Information:  DEDUCTIBLE: $4000 MET $1371.21                         OUT OF POCKET: $36023 MET $1371.21              INSURANCE PAYS AT: 80% AFTER DED                PATIENT RESPONSIBILITY AND/OR CO-PAY: 20% AFTER DED   SECONDARY INSURANCE COMPANY:  NA      PRE CERTIFICATION REQUIRED: NO  INSURANCE THERAPY BENEFIT:  20 VISITS -HARDMAX -NONE USED  AQUATIC THERAPY COVERED: YES  MODALITIES COVERED:  YES, NO IONTO   Visit # 5 5/10 for progress note   Visits Allowed: 20   Recertification Date: 12 weeks, 23    Physician Follow-Up: May 10th    Physician Orders: Follow protocol  20 -30# wb for 4-6 weeks. No open chain extension. History of Present Illness: Skiing 4 weeks,  tore ACL  repaired on 23   with meniscus repair. SUBJECTIVE: tingling in lower leg at night.     TREATMENT   Precautions: -# wb flex to 70 degrees  unitl 23,    Pain:0/10     X in shaded column indicates activity completed today   Modalities Parameters/  Location  Notes                     Manual Therapy Time/Technique  Notes               Drain removed, dressing applied

## 2023-05-18 ENCOUNTER — APPOINTMENT (OUTPATIENT)
Dept: PHYSICAL THERAPY | Age: 23
End: 2023-05-18
Payer: COMMERCIAL

## 2023-05-22 ENCOUNTER — APPOINTMENT (OUTPATIENT)
Dept: PHYSICAL THERAPY | Age: 23
End: 2023-05-22
Payer: COMMERCIAL

## 2023-05-22 PROCEDURE — 97110 THERAPEUTIC EXERCISES: CPT

## 2023-05-22 NOTE — PROGRESS NOTES
[address] [facility]  PHYSICAL THERAPY  [] EVALUATION  [x] DAILY NOTE (LAND) [] DAILY NOTE (AQUATIC ) [] PROGRESS NOTE [] DISCHARGE NOTE    [] OUTPATIENT REHABILITATION [facility]   [] Annette     [] [address] [facility]   [x] [place]    Date: 2023  Patient Name:  Colby Callahan  : 2000  MRN: [id number]    Referring Practitioner Fareed Greco MD   Diagnosis Sprain of anterior cruciate ligament of right knee, subsequent encounter [W85.450G]    Treatment Diagnosis Right weakness and stiffness, , gait dysfunction. Date of Evaluation 23    Additional Pertinent History Healthy 20year old. Did strain left knee in the past.m      Functional Outcome Measure Used KOOSjr   Functional Outcome Score 22 (23)       Insurance: Primary: Payor: [facility] /  /  / ,   Secondary:    Authorization Information:  DEDUCTIBLE: $4000 MET $1371.21                         OUT OF POCKET: $16009 MET $1371.21              INSURANCE PAYS AT: 80% AFTER DED                PATIENT RESPONSIBILITY AND/OR CO-PAY: 20% AFTER DED   SECONDARY INSURANCE COMPANY:  NA      PRE CERTIFICATION REQUIRED: NO  INSURANCE THERAPY BENEFIT:  20 VISITS -HARDMAX -NONE USED  AQUATIC THERAPY COVERED: YES  MODALITIES COVERED:  YES, NO IONTO   Visit # 6 6/10 for progress note   Visits Allowed: 20   Recertification Date: 12 weeks, 23    Physician Follow-Up: May 10th    Physician Orders: Follow protocol  20 -30# wb for 4-6 weeks. No open chain extension. History of Present Illness: Skiing 4 weeks,  tore ACL  repaired on 23   with meniscus repair. SUBJECTIVE: Tingling in lower leg at night. It is getting better. Holding therapy until able to [facility] next week. Stress need to ext knee this week.        TREATMENT   Precautions: -# wb flex to 70 degrees  unitl 23,    Pain:0/10     X in shaded column indicates activity completed today   Modalities Parameters/  Location  Notes

## 2023-05-25 ENCOUNTER — APPOINTMENT (OUTPATIENT)
Dept: PHYSICAL THERAPY | Age: 23
End: 2023-05-25
Payer: COMMERCIAL

## 2023-05-31 ENCOUNTER — APPOINTMENT (OUTPATIENT)
Dept: PHYSICAL THERAPY | Age: 23
End: 2023-05-31
Payer: COMMERCIAL

## 2023-05-31 PROCEDURE — 97110 THERAPEUTIC EXERCISES: CPT

## 2023-05-31 NOTE — PROGRESS NOTES
[address] [facility]  PHYSICAL THERAPY  [] EVALUATION  [] DAILY NOTE (LAND) [] DAILY NOTE (AQUATIC ) [x] PROGRESS NOTE [] DISCHARGE NOTE    [] OUT[facility]IENT [facility]ATION [facility] - [place]   [] Annette     [] [address] [facility]   [x] Manish Srinivasan    Date: 2023  Patient Name:  Zeke Farias  : 2000  MRN: [id number]    Referring Practitioner Neal Pike MD   Diagnosis Sprain of anterior cruciate ligament of right knee, subsequent encounter [S81.550D]    Treatment Diagnosis Right weakness and stiffness, , gait dysfunction. Date of Evaluation 23    Additional Pertinent History Healthy 20year old. Did strain left knee in the past.m      Functional Outcome Measure Used KOjr NERI   Functional Outcome Score 22 (23)       Insurance: Primary: Payor: Renea Santamaria /  /  / ,   Secondary:    Authorization Information:  DEDUCTIBLE: $4000 MET $1371.21                         OUT OF POCKET: $94171 MET $1371.21              INSURANCE PAYS AT: 80% AFTER DED                PATIENT RESPONSIBILITY AND/OR CO-PAY: 20% AFTER DED   SECONDARY INSURANCE COMPANY:  NA      PRE CERTIFICATION REQUIRED: NO  INSURANCE THERAPY BENEFIT:  20 VISITS -HARDMAX -NONE USED  AQUATIC THERAPY COVERED: YES  MODALITIES COVERED:  YES, NO IONTO   Visit # 7 0 /10 for progress note   Visits Allowed: 20   Recertification Date: 12 weeks, 23    Physician Follow-Up: May 10th    Physician Orders: Follow protocol  20 -30# wb for 4-6 weeks. No open chain extension. History of Present Illness: Skiing 4 weeks,  tore ACL  repaired on 23   with meniscus repair. SUBJECTIVE: Tingling in lower leg at night. It is getting better. He has been compaint with HEP  much better extension prior to stretching,  walked in with B crutches. and touch wb     TREATMENT   Precautions: WBAT , ROM 0-120 by 23, limit squats to 45 degrees.    Pain:0/10     X in shaded column indicates activity completed today

## 2023-06-05 ENCOUNTER — HOSPITAL ENCOUNTER (OUTPATIENT)
Dept: PHYSICAL THERAPY | Age: 23
Setting detail: THERAPIES SERIES
Discharge: HOME | End: 2023-06-05
Payer: COMMERCIAL

## 2023-06-05 PROCEDURE — 97110 THERAPEUTIC EXERCISES: CPT

## 2023-06-05 NOTE — PROGRESS NOTES
step length on level surfaces. Long Term Goals:  Time Frame: 12 weeks -- none met   Decrease pain to less than a 1/10  and only after exercise   Increase strength of the knee to 4+/5 to walk on all surfaces without  AD   I HEP       Patient Education: Stretch ext every hour,  increase reps to 30   [x]  HEP/Education Completed: Plan of Care, Goals, exercise, precautions,  written protocol with exercise updates. [employer] Access Code:  []  No new Education completed  []  Reviewed Prior HEP      []  Patient verbalized and/or demonstrated understanding of education provided. []  Patient unable to verbalize and/or demonstrate understanding of education provided. Will continue education. [x]  Barriers to learning: none     PLAN:  Treatment Recommendations: Strengthening, Range of Motion, Balance Training, Functional Mobility Training, Endurance Training, Gait Training, Stair Training, Manual Therapy - Soft Tissue Mobilization, Manual Therapy - Joint Manipulation, Pain Management, Home Exercise Program, Patient Education, Positioning, Integrative Dry Needling, and Modalities    []  Plan of care initiated. Plan to see patient 2 times per week for 8 weeks to address the treatment planned outlined above.   [x]  Continue with current plan of care  []  Modify plan of care as follows:    []  Hold pending physician visit  []  Discharge    Time In 1500   Time Out 1535   Timed Code Minutes: 35 min   Total Treatment Time: 35  min       Electronically Signed by: Hari Ayala PT

## 2023-06-08 ENCOUNTER — HOSPITAL ENCOUNTER (OUTPATIENT)
Dept: PHYSICAL THERAPY | Age: 23
Setting detail: THERAPIES SERIES
Discharge: HOME | End: 2023-06-08
Payer: COMMERCIAL

## 2023-06-08 PROCEDURE — 97530 THERAPEUTIC ACTIVITIES: CPT

## 2023-06-08 PROCEDURE — 97110 THERAPEUTIC EXERCISES: CPT

## 2023-06-08 NOTE — PROGRESS NOTES
[address] [facility]  PHYSICAL THERAPY  [] EVALUATION  [x] DAILY NOTE (LAND) [] DAILY NOTE (AQUATIC ) [] PROGRESS NOTE [] DISCHARGE NOTE    [] [address] [facility]   [] Annette [address]    [] [address] [facility]   [x] Ronald Gupta    Date: 2023  Patient Name:  Noman Dent  : 2000  MRN: [id number]    Referring Practitioner Cathleen Skaggs MD   Diagnosis Sprain of anterior cruciate ligament of right knee, subsequent encounter [C76.435O]    Treatment Diagnosis Right weakness and stiffness, , gait dysfunction. Date of Evaluation 23    Additional Pertinent History Healthy 20year old. Did strain left knee in the past.m      Functional Outcome Measure Used KOOSjr   Functional Outcome Score 22 (23)       Insurance: Primary: Payor: Baldemar Olson /  /  / ,   Secondary:    Authorization Information:  DEDUCTIBLE: $4000 MET $1371.21                         OUT OF POCKET: $37244 MET $1371.21              INSURANCE PAYS AT: 80% AFTER DED                PATIENT RESPONSIBILITY AND/OR CO-PAY: 20% AFTER DED   SECONDARY INSURANCE COMPANY:  NA      PRE CERTIFICATION REQUIRED: NO  INSURANCE THERAPY BENEFIT:  20 VISITS -HARDMAX -NONE USED  AQUATIC THERAPY COVERED: YES  MODALITIES COVERED:  YES, NO IONTO   Visit # 9, 2 /10 for progress note   Visits Allowed: 20   Recertification Date: 12 weeks, 23    Physician Follow-Up: May 10th    Physician Orders: Follow protocol  20 -30# wb for 4-6 weeks. No open chain extension. History of Present Illness: Skiing 4 weeks,  tore ACL  repaired on 23   with meniscus repair. SUBJECTIVE: Ambulating with single crutch. TREATMENT   Precautions: WBAT , ROM 0-120 by 23, limit squats to 45 degrees.    Pain:0/10     X in shaded column indicates activity completed today   Modalities Parameters/  Location  Notes                     Manual Therapy Time/Technique  Notes               Drain removed, dressing applied

## 2023-06-20 ENCOUNTER — HOSPITAL ENCOUNTER (OUTPATIENT)
Dept: PHYSICAL THERAPY | Age: 23
Setting detail: THERAPIES SERIES
Discharge: HOME | End: 2023-06-20
Payer: COMMERCIAL

## 2023-06-20 PROCEDURE — 97110 THERAPEUTIC EXERCISES: CPT

## 2023-06-22 ENCOUNTER — APPOINTMENT (OUTPATIENT)
Dept: PHYSICAL THERAPY | Age: 23
End: 2023-06-22
Payer: COMMERCIAL

## 2023-06-26 ENCOUNTER — HOSPITAL ENCOUNTER (OUTPATIENT)
Dept: PHYSICAL THERAPY | Age: 23
Setting detail: THERAPIES SERIES
Discharge: HOME | End: 2023-06-26
Payer: COMMERCIAL

## 2023-06-26 PROCEDURE — 97110 THERAPEUTIC EXERCISES: CPT

## 2023-07-12 ENCOUNTER — HOSPITAL ENCOUNTER (OUTPATIENT)
Dept: PHYSICAL THERAPY | Age: 23
Setting detail: THERAPIES SERIES
Discharge: HOME | End: 2023-07-12
Payer: COMMERCIAL

## 2023-07-12 PROCEDURE — 97110 THERAPEUTIC EXERCISES: CPT

## 2023-07-19 ENCOUNTER — HOSPITAL ENCOUNTER (OUTPATIENT)
Dept: PHYSICAL THERAPY | Age: 23
Setting detail: THERAPIES SERIES
Discharge: HOME | End: 2023-07-19
Payer: COMMERCIAL

## 2023-07-19 PROCEDURE — 97110 THERAPEUTIC EXERCISES: CPT

## 2023-08-02 ENCOUNTER — HOSPITAL ENCOUNTER (OUTPATIENT)
Dept: PHYSICAL THERAPY | Age: 23
Setting detail: THERAPIES SERIES
Discharge: HOME | End: 2023-08-02
Payer: COMMERCIAL

## 2023-08-02 PROCEDURE — 97110 THERAPEUTIC EXERCISES: CPT

## 2023-08-02 NOTE — PROGRESS NOTES
posture  Prognosis: good    GOALS:  Patient Goal: go back to driving , walking with crutches. Short Term Goals:  Time Frame: 4-6     Decrease pain to less than 2/10  MET   unless he stretches,  new goal : pain less than 2/10     ROM 0 to 120   partially met  cont goal   Knee ext and flex  4+  not met  still weakness in quad, cont goal   Intiate HEP   MET  no new goal   patient to walk without AD in the home with equal weight shift and step length on level surfaces. -- not met   cont goal     Long Term Goals:  Time Frame: 12 weeks -- none met   Decrease pain to less than a 1/10  and only after exercise   Increase strength of the knee to 4+/5 to walk on all surfaces without  AD   I HEP   ROM 0 to 135     Patient Education: Stretch ext every hour,  increase reps to 30   [x]  HEP/Education Completed: Plan of Care, Goals, exercise, precautions,  written protocol with exercise updates. [employer] Access Code:  []  No new Education completed  [x]  Reviewed Prior HEP      [x]  Patient verbalized and/or demonstrated understanding of education provided. []  Patient unable to verbalize and/or demonstrate understanding of education provided. Will continue education. [x]  Barriers to learning: none     PLAN:  Treatment Recommendations: Strengthening, Range of Motion, Balance Training, Functional Mobility Training, Endurance Training, Gait Training, Stair Training, Manual Therapy - Soft Tissue Mobilization, Manual Therapy - Joint Manipulation, Pain Management, Home Exercise Program, Patient Education, Positioning, Integrative Dry Needling, and Modalities    []  Plan of care initiated. Plan to see patient 2 times per week for 8 weeks to address the treatment planned outlined above.   [x]  Continue with current plan of care 1 time every other week  due to  limited visits per year, recommend a dynasplint brace to regain functional flexion and extension   []  Modify plan of care as follows:    []  Hold pending physician

## 2023-08-16 ENCOUNTER — HOSPITAL ENCOUNTER (OUTPATIENT)
Dept: PHYSICAL THERAPY | Age: 23
Setting detail: THERAPIES SERIES
Discharge: HOME | End: 2023-08-16
Payer: COMMERCIAL

## 2023-08-16 PROCEDURE — 97110 THERAPEUTIC EXERCISES: CPT

## 2023-08-30 ENCOUNTER — HOSPITAL ENCOUNTER (OUTPATIENT)
Dept: PHYSICAL THERAPY | Age: 23
Setting detail: THERAPIES SERIES
Discharge: HOME | End: 2023-08-30
Payer: COMMERCIAL

## 2023-08-30 PROCEDURE — 97110 THERAPEUTIC EXERCISES: CPT

## 2023-08-30 NOTE — PROGRESS NOTES
** PLEASE SIGN, DATE AND TIME CERTIFICATION BELOW AND RETURN TO [facility] OUTPATIENT REHABILITATION (FAX #: [phone]). ATTEST/CO-SIGN IF ACCESSING VIA [employer]. THANK YOU.**    I certify that I have examined the patient below and determined that Physical Medicine and Rehabilitation service is necessary and that I approve the established plan of care for up to 90 days or as specifically noted. Attestation, signature or co-signature of physician indicates approval of certification requirements.    ________________________ ____________ __________  Physician Signature   Date   Time     [facility]  [] EVALUATION  [] DAILY NOTE (LAND) [] DAILY NOTE (AQUATIC ) [x] PROGRESS NOTE [] DISCHARGE NOTE    [] [address] [facility] [address] - [place]   [] [address] [facility]    [] [address] [facility]   [x] [facility]    Date: 2023  Patient Name:  Van [place]  : 2000  MRN: [id number]    Referring Practitioner Los Holguin MD   Diagnosis Sprain of anterior cruciate ligament of right knee, subsequent encounter [Y54.298I]    Treatment Diagnosis Right weakness and stiffness, , gait dysfunction. Date of Evaluation 23    Additional Pertinent History Healthy 20year old.   Did strain left knee in the past.      Functional Outcome Measure Used KOOS, jr   Functional Outcome Score 22 (23)       Insurance: Primary: Payor: Aida Course /  /  / ,   Secondary:    Authorization Information:  DEDUCTIBLE: $4000 MET $1371.21                         OUT OF POCKET: $50748 MET $1371.21              INSURANCE PAYS AT: 80% AFTER DED                PATIENT RESPONSIBILITY AND/OR CO-PAY: 20% AFTER DED   SECONDARY INSURANCE COMPANY:  NA      PRE CERTIFICATION REQUIRED: NO  INSURANCE THERAPY BENEFIT:  20 VISITS -HARDMAX -NONE USED  AQUATIC THERAPY COVERED: YES  MODALITIES COVERED:  YES, NO IONTO   Visit # 18,  3/10 for progress note   Visits Allowed: 20   Recertification

## 2023-09-27 ENCOUNTER — HOSPITAL ENCOUNTER (OUTPATIENT)
Dept: PHYSICAL THERAPY | Age: 23
Setting detail: THERAPIES SERIES
Discharge: HOME | End: 2023-09-27
Payer: COMMERCIAL

## 2023-09-27 PROCEDURE — 97110 THERAPEUTIC EXERCISES: CPT
